# Patient Record
Sex: FEMALE | Race: WHITE | URBAN - METROPOLITAN AREA
[De-identification: names, ages, dates, MRNs, and addresses within clinical notes are randomized per-mention and may not be internally consistent; named-entity substitution may affect disease eponyms.]

---

## 2023-02-21 ENCOUNTER — TELEPHONE (OUTPATIENT)
Dept: PHARMACY | Age: 63
End: 2023-02-21

## 2023-02-21 NOTE — TELEPHONE ENCOUNTER
New referral for Anticoagulation Services. Reached out to schedule the initial appointment. Someone answered. Anisha Auguste is not in at this time. I will try again at a later time. Delbert aHll, PharmD,  [facility]  Anticoagulation Service  [phone]    For Pharmacy Admin Tracking Only    Intervention Detail:   Total # of Interventions Recommended:    Total # of Interventions Accepted:   Time Spent (min): 5

## 2023-02-23 ENCOUNTER — TELEPHONE (OUTPATIENT)
Dept: PHARMACY | Age: 63
End: 2023-02-23

## 2023-02-23 NOTE — TELEPHONE ENCOUNTER
New referral for Anticoagulation Services. Reached out to schedule the initial appointment. Spoke with Ariane. Scheduled for 3/9/23 in coordination with her mother's appt. On warfarin for a few years. Dorian Trevino, PharmD, [address] [place] [address]  Anticoagulation Service  [phone]    For Pharmacy Admin Tracking Only    Intervention Detail:   Total # of Interventions Recommended:    Total # of Interventions Accepted:   Time Spent (min): 10

## 2023-03-09 ENCOUNTER — ANTI-COAG VISIT (OUTPATIENT)
Dept: PHARMACY | Age: 63
End: 2023-03-09

## 2023-03-09 DIAGNOSIS — I51.3 MURAL THROMBUS OF LEFT VENTRICLE: Primary | ICD-10-CM

## 2023-03-09 RX ORDER — SERTRALINE HYDROCHLORIDE 100 MG/1
100 TABLET, FILM COATED ORAL 2 TIMES DAILY
COMMUNITY

## 2023-03-09 RX ORDER — ASPIRIN 81 MG
1 TABLET, DELAYED RELEASE (ENTERIC COATED) ORAL DAILY
COMMUNITY

## 2023-03-09 RX ORDER — PANTOPRAZOLE SODIUM 40 MG/1
40 TABLET, DELAYED RELEASE ORAL DAILY
COMMUNITY

## 2023-03-09 RX ORDER — EVOLOCUMAB 140 MG/ML
140 INJECTION, SOLUTION SUBCUTANEOUS
COMMUNITY

## 2023-03-09 RX ORDER — NITROGLYCERIN 0.4 MG/1
0.4 TABLET SUBLINGUAL EVERY 5 MIN PRN
COMMUNITY

## 2023-03-09 RX ORDER — CLOPIDOGREL BISULFATE 75 MG/1
75 TABLET ORAL DAILY
COMMUNITY

## 2023-03-09 RX ORDER — VITAMIN B COMPLEX
1 CAPSULE ORAL DAILY
COMMUNITY

## 2023-03-09 RX ORDER — FERROUS SULFATE 325(65) MG
325 TABLET ORAL
COMMUNITY

## 2023-03-09 RX ORDER — WARFARIN SODIUM 5 MG/1
5 TABLET ORAL
COMMUNITY

## 2023-03-09 RX ORDER — GABAPENTIN 300 MG/1
300 CAPSULE ORAL 3 TIMES DAILY
COMMUNITY

## 2023-03-09 RX ORDER — ROSUVASTATIN CALCIUM 40 MG/1
40 TABLET, COATED ORAL DAILY
COMMUNITY

## 2023-03-09 RX ORDER — BUPROPION HYDROCHLORIDE 300 MG/1
300 TABLET ORAL DAILY
COMMUNITY

## 2023-03-09 RX ORDER — SPIRONOLACTONE 25 MG/1
25 TABLET ORAL DAILY
COMMUNITY

## 2023-03-09 RX ORDER — MELATONIN
1000 DAILY
COMMUNITY

## 2023-03-09 RX ORDER — EZETIMIBE 10 MG/1
10 TABLET ORAL DAILY
COMMUNITY

## 2023-03-09 RX ORDER — METOPROLOL SUCCINATE 100 MG/1
100 TABLET, EXTENDED RELEASE ORAL DAILY
COMMUNITY

## 2023-03-09 NOTE — PATIENT INSTRUCTIONS
INR 2.8 on 3/9/23     Goal 2-3    Continue Current regimen Warfarin 7.5 mg everyday EXCEPT 5 mg on Mondays, Wednesdays, and Fridays     Next Appointment on April 14th at 1:00 PM

## 2023-03-09 NOTE — PROGRESS NOTES
Gómez Kendall is a 58 y.o. here for warfarin management. Ariane had an INR test today. Results were reviewed and appropriate warfarin management was completed. This visit was performed as: An in person visit. Protocols were followed with precautions to reduce the spread of COVID-19. Patient verifies current warfarin dosing regimen: Yes     Warfarin medication reviewed and updated on the patient 's home medication list: Yes   All other medications reviewed and updated on the patient 's home medication list: Yes - new patient, patient list updated from     No results found for: INR    Patient Findings       Positives:  Bruising    Negatives:  Signs/symptoms of thrombosis, Signs/symptoms of bleeding, Change in health, Change in alcohol use, Change in activity, Upcoming invasive procedure, Emergency department visit, Upcoming dental procedure, Missed doses, Extra doses, Change in medications, Change in diet/appetite, Hospital admission, Other complaints    Comments:  Usually bruises easily and nothing out of ordinary   INR 2.8 3/9/23                 There were no vitals taken for this visit. Warfarin assessment / plan:     Appears well. No changes affecting warfarin therapy were noted. No acute findings. INR within goal range. No change to warfarin dosing today. Patient presented to clinic with her mother and is a new patient to our clinic from [address] [place] [address]. She has been on Warfarin for years and will start seeing us at [facility]. Patient's med list has been updated from [facility] records on her chart. Patient is aware of consistency with diet with vitamin K vegetables, does not consume alcohol, or any cranberry juice/grapefruit juice. Patient is adherent to Warfarin and has pill organizer at home. Her INR goal is 2.0-3.0. Patient reported taking multivitamins as well and instructed her to stay consistent with taking those and to call us if she stops them. Patient's INR was therapeutic today at 2.8.  We will continue her current of Warfarin 7.5 mg daily EXCEPT 5 mg on , , . Advised patient to call us with any signs/symptoms of bleeding/bruising or any medication changes. Immunization History   Administered Date(s) Administered    COVID-19, PFIZER PURPLE top, DILUTE for use, (age 15 y+), 30mcg/0.3mL 2021, 2021           No orders of the defined types were placed in this encounter. No orders of the defined types were placed in this encounter. Reviewed AVS with patient / caregiver.     Billing Points:  Complex Education (disease state, med overview, expected symptoms) - 3 point      For Pharmacy Admin Tracking Only    Intervention Detail: Adherence Monitorin  Total # of Interventions Recommended: 1  Total # of Interventions Accepted: 1  Time Spent (min): 20       Monserrat Barnett PharmD Candidate 2023 3/9/2023 12:11 PM

## 2023-03-13 PROBLEM — I51.3 MURAL THROMBUS OF LEFT VENTRICLE: Status: ACTIVE | Noted: 2023-03-13

## 2023-03-13 LAB — INTERNATIONAL NORMALIZATION RATIO, POC: 2.8

## 2023-03-13 PROCEDURE — 85610 PROTHROMBIN TIME: CPT | Performed by: SPEECH-LANGUAGE PATHOLOGIST

## 2023-03-13 PROCEDURE — 99212 OFFICE O/P EST SF 10 MIN: CPT | Performed by: SPEECH-LANGUAGE PATHOLOGIST

## 2023-05-11 ENCOUNTER — ANTI-COAG VISIT (OUTPATIENT)
Dept: PHARMACY | Age: 63
End: 2023-05-11

## 2023-05-11 DIAGNOSIS — I51.3 MURAL THROMBUS OF LEFT VENTRICLE: Primary | ICD-10-CM

## 2023-05-11 LAB — INTERNATIONAL NORMALIZATION RATIO, POC: 4

## 2023-05-11 PROCEDURE — 99211 OFF/OP EST MAY X REQ PHY/QHP: CPT | Performed by: SPEECH-LANGUAGE PATHOLOGIST

## 2023-05-11 PROCEDURE — 85610 PROTHROMBIN TIME: CPT | Performed by: SPEECH-LANGUAGE PATHOLOGIST

## 2023-05-17 NOTE — PROGRESS NOTES
Selvin Eldridge is a 58 y.o. here for warfarin management. Ariane had an INR test today. Results were reviewed and appropriate warfarin management was completed. Patient verifies current warfarin dosing regimen: Yes     Warfarin medication reviewed and updated on the patient 's home medication list: Yes   All other medications reviewed and updated on the patient 's home medication list: Yes     Lab Results   Component Value Date    INR 4.0 2023    INR 3.5 2023    INR 2.8 2023       Anticoagulation Summary  As of 2023      INR goal:  2.0-3.0   TTR:  6.7 % (1.8 mo)   INR used for dosin.0 (2023)   Warfarin maintenance plan:  7.5 mg (5 mg x 1.5) every Mon, Wed, Fri; 5 mg (5 mg x 1) all other days   Weekly warfarin total:  42.5 mg   Plan last modified:  Devante Aguilar, [facility] (2023)   Next INR check:  2023   Priority:  Maintenance   Target end date: Indefinite    Indications    Mural thrombus of left ventricle [I51.3]                 Anticoagulation Episode Summary       INR check location:      Preferred lab:      Send INR reminders to:  [address] [place] [address]    Comments:  FAX: [phone]          Anticoagulation Care Providers       Provider Role Specialty Phone number    Jocelyne Lobo DO Referring  [phone]          There were no vitals taken for this visit. Warfarin assessment / plan:     Appears well  Supra-therapeutic INR. Denies signs and symptoms of bleeding/bruising. Denies medication changes. Patient states she is not aware of any changes to her diet, activity level, or medications. States she does not think she took any extra doses, but that it could be possible but unlikely. Since this is the patient's second consecutively supra therapeutic INR, I will have the patient hold warfarin today  and tomorrow 23.   I will then have the patient take a new reduced dose of 5 mg daily, except 7.5 mg every Monday, Wednesday, and

## 2023-06-08 ENCOUNTER — ANTI-COAG VISIT (OUTPATIENT)
Dept: PHARMACY | Age: 63
End: 2023-06-08

## 2023-06-08 DIAGNOSIS — I51.3 MURAL THROMBUS OF LEFT VENTRICLE: Primary | ICD-10-CM

## 2023-06-08 LAB — INR BLD: 2.7

## 2023-06-08 PROCEDURE — 99211 OFF/OP EST MAY X REQ PHY/QHP: CPT | Performed by: SPEECH-LANGUAGE PATHOLOGIST

## 2023-06-08 PROCEDURE — 85610 PROTHROMBIN TIME: CPT | Performed by: SPEECH-LANGUAGE PATHOLOGIST

## 2023-06-08 NOTE — PROGRESS NOTES
to have one alcoholic drink on occasion. Green vegetables decrease the INR. If your INR is high that means your blood is \"too thin. \"    Call [phone] with signs or symptoms of bleeding or ANY medication changes (including antibiotics, steroids, over-the-counter medications or herbal supplements). Call if you stop taking ANY medications. If significant bleeding occurs or if you fall and hit your head, please seek immediate medical attention. Keep the number of servings of vitamin K containing foods (dark green, leafy vegetables like spinach, kale, and greens as well as broccoli, brussel sprouts and asparagus) the same each week. Please call if this changes. Limit alcohol intake. Please call if this changes. There is no immunization history for the selected administration types on file for this patient. Orders Placed This Encounter   Procedures    Protime-INR     This external order was created through the results console. No orders of the defined types were placed in this encounter. Reviewed AVS with patient / caregiver.     Billing Points:  BASIC ASSESSMENT UNCOMPLICATED (including but not limited to: vital signs; physical assessment screening; review of secondary markers like lab results, allergies, home readings; instructions on treatment plan and basic education; assessment of medication list with one med change and compliance) - 2 points     For Pharmacy Admin Tracking Only    Intervention Detail: Adherence Monitorin  Total # of Interventions Recommended: 1  Total # of Interventions Accepted: 0  Time Spent (min): 10

## 2023-07-06 ENCOUNTER — ANTI-COAG VISIT (OUTPATIENT)
Dept: PHARMACY | Age: 63
End: 2023-07-06
Payer: MEDICARE

## 2023-07-06 DIAGNOSIS — I51.3 MURAL THROMBUS OF LEFT VENTRICLE: Primary | ICD-10-CM

## 2023-07-06 LAB — INTERNATIONAL NORMALIZATION RATIO, POC: 2.3

## 2023-07-06 PROCEDURE — 85610 PROTHROMBIN TIME: CPT

## 2023-07-06 PROCEDURE — 99211 OFF/OP EST MAY X REQ PHY/QHP: CPT

## 2023-07-06 NOTE — PROGRESS NOTES
Brett Valle is a 58 y.o. here for warfarin management. Ariane had an INR test today. Results were reviewed and appropriate warfarin management was completed. Patient verifies current warfarin dosing regimen: Yes     Warfarin medication reviewed and updated on the patient 's home medication list: Yes   All other medications reviewed and updated on the patient 's home medication list: No: no changes     Lab Results   Component Value Date    INR 2.3 2023    INR 2.70 2023    INR 4.0 2023     Patient Findings       Negatives:  Signs/symptoms of thrombosis, Signs/symptoms of bleeding, Change in medications, Change in diet/appetite, Bruising          Anticoagulation Summary  As of 2023      INR goal:  2.0-3.0   TTR:  34.8 % (3.6 mo)   INR used for dosin.3 (2023)   Warfarin maintenance plan:  7.5 mg (5 mg x 1.5) every Mon, Wed, Fri; 5 mg (5 mg x 1) all other days   Weekly warfarin total:  42.5 mg   [facility]   Next INR check:  8/15/2023   Priority:  Maintenance   Target end date: Indefinite    Indications    Mural thrombus of left ventricle [I51.3]                 Anticoagulation Episode Summary       INR check location:      Preferred lab:      Send INR reminders to:  [address] [place] [address]    Comments:  FAX: [phone]          Anticoagulation Care Providers       Provider Role Specialty Phone number    DO Darryl Pineda  [phone]          There were no vitals taken for this visit. Warfarin assessment / plan:     Patient appears well today. No changes affecting warfarin therapy were noted. No acute findings with regards to warfarin therapy. INR today is within goal range. Instructed to continue the same weekly warfarin dose. Description    CONTINUE: Warfarin 5 mg daily, except 7.5 mg , , and . Will have a green vegetable once a week starting 23.    Plans to

## 2023-08-15 ENCOUNTER — ANTI-COAG VISIT (OUTPATIENT)
Dept: PHARMACY | Age: 63
End: 2023-08-15
Payer: MEDICARE

## 2023-08-15 DIAGNOSIS — I51.3 MURAL THROMBUS OF LEFT VENTRICLE: Primary | ICD-10-CM

## 2023-08-15 LAB — INTERNATIONAL NORMALIZATION RATIO, POC: 2.9

## 2023-08-15 PROCEDURE — 85610 PROTHROMBIN TIME: CPT | Performed by: SPEECH-LANGUAGE PATHOLOGIST

## 2023-08-15 PROCEDURE — 99211 OFF/OP EST MAY X REQ PHY/QHP: CPT | Performed by: SPEECH-LANGUAGE PATHOLOGIST

## 2023-08-21 NOTE — PROGRESS NOTES
Santa Robins is a 58 y.o. here for warfarin management. Ariane had an INR test today. Results were reviewed and appropriate warfarin management was completed. Patient verifies current warfarin dosing regimen: Yes     Warfarin medication reviewed and updated on the patient 's home medication list: Yes   All other medications reviewed and updated on the patient 's home medication list: Yes     Lab Results   Component Value Date    INR 2.9 08/15/2023    INR 2.3 2023    INR 2.70 2023       Anticoagulation Summary  As of 8/15/2023      INR goal:  2.0-3.0   TTR:  52.2 % (5 mo)   INR used for dosin.9 (8/15/2023)   Warfarin maintenance plan:  7.5 mg (5 mg x 1.5) every Mon, Wed, Fri; 5 mg (5 mg x 1) all other days   Weekly warfarin total:  42.5 mg   Plan last modified:  Karley Horta, [facility] (2023)   Next INR check:  2023   Priority:  Maintenance   Target end date: Indefinite    Indications    Mural thrombus of left ventricle [I51.3]                 Anticoagulation Episode Summary       INR check location:      Preferred lab:      Send INR reminders to:  [address] [place] [address]    Comments:  FAX: [phone]          Anticoagulation Care Providers       Provider Role Specialty Phone number    DO Darryl Westfall  [phone]          There were no vitals taken for this visit. Warfarin assessment / plan:     Patient appears well today. No changes affecting warfarin therapy were noted. No acute findings with regards to warfarin therapy. INR today is within goal range. Instructed to continue the same weekly warfarin dose. Patient reports no significant changes in diet, medications, or activity. Patient will continue taking warfarin 5 mg daily, except 7.5 mg every Monday, Wednesday, and Friday. She will return in 4 weeks. Description    CONTINUE: Warfarin 5 mg daily, except 7.5 mg , , and .     Will have a green vegetable

## 2023-09-13 ENCOUNTER — ANTI-COAG VISIT (OUTPATIENT)
Dept: PHARMACY | Age: 63
End: 2023-09-13
Payer: MEDICARE

## 2023-09-13 DIAGNOSIS — I51.3 MURAL THROMBUS OF LEFT VENTRICLE: Primary | ICD-10-CM

## 2023-09-13 LAB — INR BLD: 2.2

## 2023-09-13 PROCEDURE — 85610 PROTHROMBIN TIME: CPT

## 2023-09-13 PROCEDURE — 99211 OFF/OP EST MAY X REQ PHY/QHP: CPT

## 2023-09-13 NOTE — PROGRESS NOTES
Brianda Collier is a 58 y.o. here for warfarin management. Ariane had an INR test today. Results were reviewed and appropriate warfarin management was completed. Patient verifies current warfarin dosing regimen: Yes     Warfarin medication reviewed and updated on the patient 's home medication list: Yes   All other medications reviewed and updated on the patient 's home medication list: Yes     Lab Results   Component Value Date    INR 2.20 2023    INR 2.9 08/15/2023    INR 2.3 2023     Patient Findings       Negatives:  Signs/symptoms of thrombosis, Signs/symptoms of bleeding, Missed doses, Change in medications, Bruising          Anticoagulation Summary  As of 2023      INR goal:  2.0-3.0   TTR:  60.0 % (5.9 mo)   INR used for dosin.20 (2023)   Warfarin maintenance plan:  7.5 mg (5 mg x 1.5) every Mon, Wed, Fri; 5 mg (5 mg x 1) all other days   Weekly warfarin total:  42.5 mg   Plan last modified:  Nida Antonio [facility] (2023)   Next INR check:  10/11/2023   Priority:  Maintenance   Target end date: Indefinite    Indications    Mural thrombus of left ventricle [I51.3]                 Anticoagulation Episode Summary       INR check location:      Preferred lab:      Send INR reminders to:  [address] [place] [address]    Comments:  FAX: [phone]          Anticoagulation Care Providers       Provider Role Specialty Phone number    DO Darryl Drake  [phone]          There were no vitals taken for this visit. Warfarin assessment / plan:     Patient appears well today. No changes affecting warfarin therapy were noted. No acute findings with regards to warfarin therapy. INR today is within goal range. Instructed to continue the same weekly warfarin dose.       Patient's INR therapeutic at 2.2 (goal 2-3)    No warfarin changes at this time    Appointment scheduled for 4 weeks     Description    CONTINUE: Warfarin 5 mg daily, except 7.5 mg

## 2023-10-11 ENCOUNTER — ANTI-COAG VISIT (OUTPATIENT)
Dept: PHARMACY | Age: 63
End: 2023-10-11
Payer: MEDICARE

## 2023-10-11 DIAGNOSIS — I51.3 MURAL THROMBUS OF LEFT VENTRICLE: Primary | ICD-10-CM

## 2023-10-11 LAB — INTERNATIONAL NORMALIZATION RATIO, POC: 1.3

## 2023-10-11 PROCEDURE — 99211 OFF/OP EST MAY X REQ PHY/QHP: CPT

## 2023-10-11 PROCEDURE — 85610 PROTHROMBIN TIME: CPT

## 2023-10-11 NOTE — PROGRESS NOTES
Alvin Wong is a 58 y.o. here for warfarin management. Ariane had an INR test today. Results were reviewed and appropriate warfarin management was completed. Patient verifies current warfarin dosing regimen: Yes     Warfarin medication reviewed and updated on the patient 's home medication list: Yes   All other medications reviewed and updated on the patient 's home medication list: No: no changes. Lab Results   Component Value Date    INR 1.3 10/11/2023    INR 2.20 2023    INR 2.9 08/15/2023     Patient Findings       Positives:  Change in health, Change in medications, Change in diet/appetite, Hospital admission, Bruising    Negatives:  Signs/symptoms of bleeding    Comments:  Had cardiac cath on 10-6. Held warfarin for 5 days prior. Is now on Lovenox bridge. Significant bruising from Lovenox shots. Recent decrease on appetite. Anticoagulation Summary  As of 10/11/2023      INR goal:  2.0-3.0   TTR:  54.9 % (6.9 mo)   INR used for dosin.3 (10/11/2023)   Warfarin maintenance plan:  7.5 mg (5 mg x 1.5) every Mon, Wed, Fri; 5 mg (5 mg x 1) all other days   Weekly warfarin total:  42.5 mg   Plan last modified:  Erica Posada, [facility] (2023)   Next INR check:     Priority:  Maintenance   Target end date: Indefinite    Indications    Mural thrombus of left ventricle [I51.3]                 Anticoagulation Episode Summary       INR check location:      Preferred lab:      Send INR reminders to:  [address] [place] [address]    Comments:  FAX: [phone]          Anticoagulation Care Providers       Provider Role Specialty Phone number    Vaniajoy James,  Referring  [phone]          There were no vitals taken for this visit. Warfarin assessment / plan:     Appears well  Sub-therapeutic INR of 1.3 today. Ariane states that she had a cardiac cath procedure on Friday 10-6-23. Her warfarin was held for 5 days prior to this procedure.  She was discharged on 10-7-23 with

## 2023-10-13 ENCOUNTER — ANTI-COAG VISIT (OUTPATIENT)
Dept: PHARMACY | Age: 63
End: 2023-10-13
Payer: MEDICARE

## 2023-10-13 DIAGNOSIS — I51.3 MURAL THROMBUS OF LEFT VENTRICLE: Primary | ICD-10-CM

## 2023-10-13 LAB — INTERNATIONAL NORMALIZATION RATIO, POC: 1.8

## 2023-10-13 PROCEDURE — 85610 PROTHROMBIN TIME: CPT

## 2023-10-13 PROCEDURE — 99211 OFF/OP EST MAY X REQ PHY/QHP: CPT

## 2023-10-27 ENCOUNTER — ANTI-COAG VISIT (OUTPATIENT)
Dept: PHARMACY | Age: 63
End: 2023-10-27
Payer: MEDICARE

## 2023-10-27 DIAGNOSIS — I51.3 MURAL THROMBUS OF LEFT VENTRICLE: Primary | ICD-10-CM

## 2023-10-27 LAB — INTERNATIONAL NORMALIZATION RATIO, POC: 2.3

## 2023-10-27 PROCEDURE — 99211 OFF/OP EST MAY X REQ PHY/QHP: CPT

## 2023-10-27 PROCEDURE — 85610 PROTHROMBIN TIME: CPT

## 2023-10-27 NOTE — PROGRESS NOTES
Qian Colorado is a 58 y.o. here for warfarin management. Ariane had an INR test today. Results were reviewed and appropriate warfarin management was completed. Patient verifies current warfarin dosing regimen: Yes     Warfarin medication reviewed and updated on the patient 's home medication list: Yes   All other medications reviewed and updated on the patient 's home medication list: Yes: She is no longer taking gabapentin. Lab Results   Component Value Date    INR 2.3 10/27/2023    INR 1.8 10/13/2023    INR 1.3 10/11/2023     Patient Findings       Positives:  Change in medications    Negatives:  Signs/symptoms of bleeding, Change in health, Missed doses, Change in diet/appetite, Bruising    Comments:  Stopped gabapentin about 1 month ago          Anticoagulation Summary  As of 10/27/2023      INR goal:  2.0-3.0   TTR:  54.7 % (7.4 mo)   INR used for dosin.3 (10/27/2023)   Warfarin maintenance plan:  7.5 mg (5 mg x 1.5) every Mon, Wed, Fri; 5 mg (5 mg x 1) all other days   Weekly warfarin total:  42.5 mg   Plan last modified:  Kirstie German, [facility] (2023)   Next INR check:  2023   Priority:  Maintenance   Target end date: Indefinite    Indications    Mural thrombus of left ventricle [I51.3]                 Anticoagulation Episode Summary       INR check location:      Preferred lab:      Send INR reminders to:  [address] [place] [address]    Comments:  FAX: [phone]          Anticoagulation Care Providers       Provider Role Specialty Phone number    DO Darryl Mckeon  [phone]          There were no vitals taken for this visit. Warfarin assessment / plan:     Patient appears well today. She is no longer taking Lovenox. She still has some bruising from the injections, but is is getting better. Her INR is finally back in range after her recent procedure. For INR of 2.3, we will continue her previous weekly warfarin dose.  OF note, she states that she stopped

## 2023-11-17 ENCOUNTER — ANTI-COAG VISIT (OUTPATIENT)
Dept: PHARMACY | Age: 63
End: 2023-11-17
Payer: MEDICARE

## 2023-11-17 DIAGNOSIS — I51.3 MURAL THROMBUS OF LEFT VENTRICLE: Primary | ICD-10-CM

## 2023-11-17 LAB — INTERNATIONAL NORMALIZATION RATIO, POC: 2.1

## 2023-11-17 PROCEDURE — 99212 OFFICE O/P EST SF 10 MIN: CPT

## 2023-11-17 PROCEDURE — 85610 PROTHROMBIN TIME: CPT

## 2023-11-17 NOTE — PROGRESS NOTES
Ese Ambrose is a 58 y.o. here for warfarin management. Ariane had an INR test today. Results were reviewed and appropriate warfarin management was completed. Patient verifies current warfarin dosing regimen: Yes     Warfarin medication reviewed and updated on the patient 's home medication list: Yes   All other medications reviewed and updated on the patient 's home medication list: Yes     Lab Results   Component Value Date    INR 2.1 2023    INR 2.3 10/27/2023    INR 1.8 10/13/2023     Patient Findings       Positives:  Upcoming invasive procedure    Negatives:  Signs/symptoms of thrombosis, Signs/symptoms of bleeding, Change in medications, Change in diet/appetite, Bruising    Comments:  Surgery planned for , holding her multi vitamin, want INR at 1.5 for procedure          Anticoagulation Summary  As of 2023      INR goal:  2.0-3.0   TTR:  58.7 % (8.1 mo)   INR used for dosin.1 (2023)   Warfarin maintenance plan:  7.5 mg (5 mg x 1.5) every Mon, Wed, Fri; 5 mg (5 mg x 1) all other days   Weekly warfarin total:  42.5 mg   Plan last modified:  Marvin Riggs, [facility] (2023)   Next INR check:  12/15/2023   Priority:  Maintenance   Target end date: Indefinite    Indications    Mural thrombus of left ventricle [I51.3]                 Anticoagulation Episode Summary       INR check location:      Preferred lab:      Send INR reminders to:  [address] [place] [address]    Comments:  FAX: [phone]          Anticoagulation Care Providers       Provider Role Specialty Phone number    Sd Hanley DO Darryl  [phone]          There were no vitals taken for this visit. Warfarin assessment / plan:     Patient appears well today. No acute findings with regards to warfarin therapy. INR today is within goal range. Scheduled for a procedure 2023. She reports they want her INR at 1.5 for the procedure.       She was instructed by our staff to take

## 2023-12-15 ENCOUNTER — ANTI-COAG VISIT (OUTPATIENT)
Dept: PHARMACY | Age: 63
End: 2023-12-15
Payer: MEDICARE

## 2023-12-15 DIAGNOSIS — I51.3 MURAL THROMBUS OF LEFT VENTRICLE: Primary | ICD-10-CM

## 2023-12-15 LAB — INTERNATIONAL NORMALIZATION RATIO, POC: 2.7

## 2023-12-15 PROCEDURE — 85610 PROTHROMBIN TIME: CPT

## 2023-12-15 PROCEDURE — 99211 OFF/OP EST MAY X REQ PHY/QHP: CPT

## 2023-12-15 NOTE — PROGRESS NOTES
Oneyda Daly is a 61 y.o. here for warfarin management. Ariane had an INR test today. Results were reviewed and appropriate warfarin management was completed. Patient verifies current warfarin dosing regimen: Yes     Warfarin medication reviewed and updated on the patient 's home medication list: Yes   All other medications reviewed and updated on the patient 's home medication list: Yes     Lab Results   Component Value Date    INR 2.7 12/15/2023    INR 2.1 2023    INR 2.3 10/27/2023     Patient Findings       Positives:  Change in medications    Negatives:  Signs/symptoms of thrombosis, Signs/symptoms of bleeding, Change in diet/appetite, Bruising    Comments:  Doxycycline , may increase the INR          Anticoagulation Summary  As of 12/15/2023      INR goal:  2.0-3.0   TTR:  62.9 % (9 mo)   INR used for dosin.7 (12/15/2023)   Warfarin maintenance plan:  7.5 mg (5 mg x 1.5) every Mon, Wed, Fri; 5 mg (5 mg x 1) all other days   Weekly warfarin total:  42.5 mg   Plan last modified:  SAUD Cavanaugh [facility] (2023)   Next INR check:  2024   Priority:  Maintenance   Target end date: Indefinite    Indications    Mural thrombus of left ventricle [I51.3]                 Anticoagulation Episode Summary       INR check location:      Preferred lab:      Send INR reminders to:  [address] [place] [address]    Comments:  FAX: [phone]          Anticoagulation Care Providers       Provider Role Specialty Phone number    Eleanor Sanchez DO Referring  [phone]          There were no vitals taken for this visit. Warfarin assessment / plan:     Patient appears well today. No changes affecting warfarin therapy were noted. No acute findings with regards to warfarin therapy. INR today is within goal range. Instructed to continue the same weekly warfarin dose. Patient reports she finished a 7 day course of doxycycline  prescribed at her last surgery.   Looks like

## 2024-01-15 ENCOUNTER — ANTI-COAG VISIT (OUTPATIENT)
Dept: PHARMACY | Age: 64
End: 2024-01-15
Payer: MEDICARE

## 2024-01-15 DIAGNOSIS — I51.3 MURAL THROMBUS OF LEFT VENTRICLE: Primary | ICD-10-CM

## 2024-01-15 LAB — INTERNATIONAL NORMALIZATION RATIO, POC: 3

## 2024-01-15 PROCEDURE — 85610 PROTHROMBIN TIME: CPT

## 2024-01-15 PROCEDURE — 99211 OFF/OP EST MAY X REQ PHY/QHP: CPT

## 2024-01-15 NOTE — PROGRESS NOTES
Ariane Davidson is a 63 y.o. here for warfarin management.  Ariane had an INR test today. Results were reviewed and appropriate warfarin management was completed.     Patient verifies current warfarin dosing regimen: Yes     Warfarin medication reviewed and updated on the patient 's home medication list: Yes   All other medications reviewed and updated on the patient 's home medication list: No new medications     Lab Results   Component Value Date    INR 3.0 01/15/2024    INR 2.7 12/15/2023    INR 2.1 11/17/2023     Patient Findings       Positives:  Change in medications    Negatives:  Signs/symptoms of thrombosis, Signs/symptoms of bleeding, Change in health, Missed doses, Change in diet/appetite, Bruising    Comments:  Updated/corrected medication profile: stopped Vitamin B complex, bupriopion, Repatha, ferrous sulfate, Nicoderm  and vitamin D-3          Anticoagulation Summary  As of 1/15/2024      INR goal:  2.0-3.0   TTR:  66.7 % (10.1 mo)   INR used for dosing:  3.0 (1/15/2024)   Warfarin maintenance plan:  7.5 mg (5 mg x 1.5) every Mon, Wed, Fri; 5 mg (5 mg x 1) all other days   Weekly warfarin total:  42.5 mg   Plan last modified:  India Martínez RPH (5/11/2023)   Next INR check:  2/12/2024   Priority:  Maintenance   Target end date:  Indefinite    Indications    Mural thrombus of left ventricle [I51.3]                 Anticoagulation Episode Summary       INR check location:      Preferred lab:      Send INR reminders to:  [facility]    Comments:  FAX: [phone]  mom = Mirlande          Anticoagulation Care Providers       Provider Role Specialty Phone number    Hanane Montanez DO Referring  [phone]          There were no vitals taken for this visit.    Warfarin assessment / plan:     Patient appears well today.      No changes affecting warfarin therapy were noted.   No acute findings with regards to warfarin therapy.  INR today is within goal range.     Instructed to

## 2024-02-21 ENCOUNTER — TELEPHONE (OUTPATIENT)
Dept: PHARMACY | Age: 64
End: 2024-02-21

## 2024-02-28 ENCOUNTER — TELEPHONE (OUTPATIENT)
Dept: PHARMACY | Age: 64
End: 2024-02-28

## 2024-02-28 ENCOUNTER — ANTI-COAG VISIT (OUTPATIENT)
Dept: PHARMACY | Age: 64
End: 2024-02-28

## 2024-02-28 PROBLEM — I51.3 MURAL THROMBUS OF LEFT VENTRICLE: Status: RESOLVED | Noted: 2023-03-13 | Resolved: 2024-02-28

## 2024-02-28 NOTE — TELEPHONE ENCOUNTER
I spoke to Ariane today about scheduling an INR appointment with us, and she informed me she will no longer be coming to this clinic due to being in the process of moving.  She says she is trying to find somewhere else to follow her anticoagulation at this time and I referred her to her referring provider, Dr. Montanez in order to get that set up.    Carlota Alfonso, PharmD 02/28/24 9:28 AM

## 2024-02-28 NOTE — PROGRESS NOTES
Patient is moving and will find a new clinic to manage her warfarin going froward.     Closed her referral and anticoagulation episode.     Made referring provider, Dr. Montanez aware.

## 2024-09-09 NOTE — TELEPHONE ENCOUNTER
Spoke with Mirlande.  She gave me a number to reach Ariane at. [phone].     Called. Someone answered and stated she was not in.     Will try again later.     Dimple Parekh, PharmD  [facility]  Anticoagulation  [phone]    For Pharmacy Admin Tracking Only    Intervention Detail:   Total # of Interventions Recommended:   Total # of Interventions Accepted:   Time Spent (min): 5      3